# Patient Record
(demographics unavailable — no encounter records)

---

## 2017-04-16 NOTE — RAD
PORTABLE CHEST:

 

History: Chest pain. 

 

FINDINGS: 

Lungs are clear. Vasculature is normal. Heart and mediastinum are unremarkable. 

 

IMPRESSION: 

No acute finding. 

 

POS: [facility]